# Patient Record
Sex: FEMALE | Race: WHITE | NOT HISPANIC OR LATINO | Employment: FULL TIME | URBAN - NONMETROPOLITAN AREA
[De-identification: names, ages, dates, MRNs, and addresses within clinical notes are randomized per-mention and may not be internally consistent; named-entity substitution may affect disease eponyms.]

---

## 2023-08-28 ENCOUNTER — OFFICE VISIT (OUTPATIENT)
Dept: CARDIOLOGY | Facility: CLINIC | Age: 43
End: 2023-08-28
Payer: COMMERCIAL

## 2023-08-28 VITALS
HEIGHT: 66 IN | HEART RATE: 96 BPM | WEIGHT: 218 LBS | OXYGEN SATURATION: 96 % | BODY MASS INDEX: 35.03 KG/M2 | SYSTOLIC BLOOD PRESSURE: 122 MMHG | DIASTOLIC BLOOD PRESSURE: 70 MMHG

## 2023-08-28 DIAGNOSIS — E78.2 MIXED HYPERLIPIDEMIA: ICD-10-CM

## 2023-08-28 DIAGNOSIS — G47.10 HYPERSOMNIA, UNSPECIFIED: Primary | ICD-10-CM

## 2023-08-28 PROCEDURE — 99204 OFFICE O/P NEW MOD 45 MIN: CPT | Performed by: NURSE PRACTITIONER

## 2023-08-28 RX ORDER — CHLORAL HYDRATE 500 MG
CAPSULE ORAL
COMMUNITY

## 2023-08-28 RX ORDER — ESCITALOPRAM OXALATE 10 MG/1
10 TABLET ORAL DAILY
COMMUNITY

## 2023-08-28 NOTE — ASSESSMENT & PLAN NOTE
Complaint for trouble staying awake during the day.she has a new bed partner who has reported snoring and witnessed apnea during sleep. She has a dry mouth in the mornings. She noticed a difference about 4-6 mouths ago with increased daytime sleepiness and increased fatigue during the day.    Plan Home sleep study for further evaluation for possible sleep apnea.

## 2023-08-28 NOTE — PATIENT INSTRUCTIONS
Quality Sleep Information, Adult  Quality sleep is important for your mental and physical health. It also improves your quality of life. Quality sleep means you:  Are asleep for most of the time you are in bed.  Fall asleep within 30 minutes.  Wake up no more than once a night.   Are awake for no longer than 20 minutes if you do wake up during the night.  Most adults need 7-8 hours of quality sleep each night.  How can poor sleep affect me?  If you do not get enough quality sleep, you may have:  Mood swings.  Daytime sleepiness.  Confusion.  Decreased reaction time.  Sleep disorders, such as insomnia and sleep apnea.  Difficulty with:  Solving problems.  Coping with stress.  Paying attention.  These issues may affect your performance and productivity at work, school, and at home. Lack of sleep may also put you at higher risk for accidents, suicide, and risky behaviors.  If you do not get quality sleep you may also be at higher risk for several health problems, including:  Infections.  Type 2 diabetes.  Heart disease.  High blood pressure.  Obesity.  Worsening of long-term conditions, like arthritis, kidney disease, depression, Parkinson's disease, and epilepsy.  What actions can I take to get more quality sleep?  A sign showing that a person should not smoke.         A sign showing that a person should not drink alcohol.      Stick to a sleep schedule. Go to sleep and wake up at about the same time each day. Do not try to sleep less on weekdays and make up for lost sleep on weekends. This does not work.  Try to get about 30 minutes of exercise on most days. Do not exercise 2-3 hours before going to bed.  Limit naps during the day to 30 minutes or less.  Do not use any products that contain nicotine or tobacco, such as cigarettes or e-cigarettes. If you need help quitting, ask your health care provider.  Do not drink caffeinated beverages for at least 8 hours before going to bed. Coffee, tea, and some sodas contain  caffeine.  Do not drink alcohol close to bedtime.  Do not eat large meals close to bedtime.  Do not take naps in the late afternoon.  Try to get at least 30 minutes of sunlight every day. Morning sunlight is best.  Make time to relax before bed. Reading, listening to music, or taking a hot bath promotes quality sleep.  Make your bedroom a place that promotes quality sleep. Keep your bedroom dark, quiet, and at a comfortable room temperature. Make sure your bed is comfortable. Take out sleep distractions like TV, a computer, smartphone, and bright lights.  If you are lying awake in bed for longer than 20 minutes, get up and do a relaxing activity until you feel sleepy.  Work with your health care provider to treat medical conditions that may affect sleeping, such as:  Nasal obstruction.  Snoring.  Sleep apnea and other sleep disorders.  Talk to your health care provider if you think any of your prescription medicines may cause you to have difficulty falling or staying asleep.  If you have sleep problems, talk with a sleep consultant. If you think you have a sleep disorder, talk with your health care provider about getting evaluated by a specialist.  Where to find more information  National Sleep Foundation website: https://sleepfoundation.org  National Heart, Lung, and Blood [place] (NHLBI): www.nhlbi.nih.gov/files/docs/public/sleep/healthy_sleep.pdf  Centers for Disease Control and Prevention (CDC): www.cdc.gov/sleep/index.html  Contact a health care provider if you:  Have trouble getting to sleep or staying asleep.  Often wake up very early in the morning and cannot get back to sleep.  Have daytime sleepiness.  Have daytime sleep attacks of suddenly falling asleep and sudden muscle weakness (narcolepsy).  Have a tingling sensation in your legs with a strong urge to move your legs (restless legs syndrome).  Stop breathing briefly during sleep (sleep apnea).  Think you have a sleep disorder or are taking a medicine  that is affecting your quality of sleep.  Summary  Most adults need 7-8 hours of quality sleep each night.  Getting enough quality sleep is an important part of health and well-being.  Make your bedroom a place that promotes quality sleep and avoid things that may cause you to have poor sleep, such as alcohol, caffeine, smoking, and large meals.  Talk to your health care provider if you have trouble falling asleep or staying asleep.  This information is not intended to replace advice given to you by your health care provider. Make sure you discuss any questions you have with your health care provider.  Document Revised: 10/17/2022 Document Reviewed: 10/17/2022  Elsevier Patient Education c 2022 [employer]

## 2023-08-28 NOTE — PROGRESS NOTES
New Sleep Consult     Date:   2023  Name: Ananya Che  :   1980  PCP: Alexandra Foster DO    Chief Complaint   Patient presents with    [facility] Care     Sleep evaluation   Neck size - 17       Subjective     History of Present Illness  Ananya hCe is a 43 y.o. female who presents today for self-referral for complaint for trouble staying awake during the day.she has a new bed partner who has reported snoring and witnessed apnea during sleep. She has a dry mouth in the mornings. She noticed a difference about 4-6 mouths ago with increased daytime sleepiness and increased fatigue during the day.    She reports she works full-time, has an adopted son that 7 years old and over the last year had a foster 2-year-old daughter in her home.  She reports she is active all the time.    Goes to bed 10PM  - [place] on the week ends. Up for work about 6-6:30AM and she sleeps past her alarms. She gets about 8 hours of sleep.     She works day shift Monday- Friday 8AM -4PM.     No specialty comments available.    Further details are as follows:    Neck Measurement: 17 inches    Tingley Scale is (out of 24): Total score: 10     Estimated average amount of sleep per night: 8  Number of times she wakes up at night: 0  Difficulty falling back asleep: no  It usually takes 5 minutes to go to sleep.  She feels sleepy upon waking up: yes  Rotating or night shift work: no    Drowsiness/Sleepiness:  She exhibits the following:  excessive daytime sleepiness  excessive daytime fatigue  falls asleep watching TV  falls asleep during times of the day when she is quiet    Snoring/Breathing:  She exhibits the following:  loud snoring, snores in all sleep positions, and awakens with dry mouth    Head Injury:  She exhibits the following:  No    Reflux:  She describes the following:  eats 2 meals daily     Narcolepsy:  She exhibits the following:  none    RLS/PLMs:  She describes the following:  none    Insomnia:  She  "describes the following:  none    Parasomnia:  She exhibits the following:  grinds teeth    Weight:       08/28/23  1307   Weight: 98.9 kg (218 lb)      Weight change in the last year:  loss: 10 lbs    The patient's relevant past medical, surgical, family, and social history reviewed and updated in Epic as appropriate.    Past Medical History:   Diagnosis Date    Anxiety      History reviewed. No pertinent surgical history.  OB History    No obstetric history on file.       No Known Allergies  Prior to Admission medications    Medication Sig Start Date End Date Taking? Authorizing Provider   escitalopram (LEXAPRO) 10 MG tablet Take 1 tablet by mouth Daily.   Yes Provider, MD Bella   Omega-3 Fatty Acids (fish oil) 1000 MG capsule capsule Take  by mouth Daily With Breakfast.   Yes ProviderBella MD     History reviewed. No pertinent family history.    Objective     Vital Signs:  /70 (BP Location: Left arm, Patient Position: Sitting)   Pulse 96   Ht 167.6 cm (66\")   Wt 98.9 kg (218 lb)   SpO2 96%   BMI 35.19 kg/mý     Class 2 Severe Obesity (BMI >=35 and <=39.9). Obesity-related health conditions include the following: dyslipidemias. Obesity is improving with lifestyle modifications. BMI is is above average; BMI management plan is completed. We discussed portion control and increasing exercise.        Physical Exam  Constitutional:       Appearance: Normal appearance. She is well-developed.   HENT:      Head: Normocephalic and atraumatic.      Nose: Nose normal.      Mouth/Throat:      Mouth: Mucous membranes are moist.   Eyes:      General: No scleral icterus.     Pupils: Pupils are equal, round, and reactive to light.   Neck:      Vascular: No carotid bruit.   Cardiovascular:      Rate and Rhythm: Normal rate and regular rhythm.      Pulses: Normal pulses.           Radial pulses are 2+ on the right side and 2+ on the left side.        Dorsalis pedis pulses are 2+ on the right side and 2+ on " the left side.        Posterior tibial pulses are 2+ on the right side and 2+ on the left side.      Heart sounds: Normal heart sounds. No murmur heard.  Pulmonary:      Effort: Pulmonary effort is normal.      Breath sounds: Normal breath sounds. No wheezing or rhonchi.   Abdominal:      General: Bowel sounds are normal.   Musculoskeletal:      Right lower leg: No edema.      Left lower leg: No edema.   Skin:     General: Skin is warm and dry.      Capillary Refill: Capillary refill takes less than 2 seconds.      Coloration: Skin is not cyanotic.      Nails: There is no clubbing.   Neurological:      Mental Status: She is alert and oriented to person, place, and time.      Motor: No weakness.      Gait: Gait normal.   Psychiatric:         Mood and Affect: Mood normal.         Behavior: Behavior normal. Behavior is cooperative.         Thought Content: Thought content normal.         Cognition and Memory: Memory normal.           Labs reviewed: 11/1/2022 lipids, CBC, CMP, hemoglobin A1c and TSH free T4 and office visit with PCP Dr. Alexandra Foster 10/10/2022          Assessment and Plan     Ananya Che is a 43 y.o. female who presents for further evaluation of excessive daytime sleepiness and fatigue and concerns for sleep disordered breathing and obstructive sleep apnea.  We will obtain testing for further evaluation.  The patient will return for follow-up and recommendations after test.  I have discussed weight loss as it pertains to obstructive sleep apnea.    Diagnoses and all orders for this visit:    1. Hypersomnia, unspecified (Primary)  Assessment & Plan:  Complaint for trouble staying awake during the day.she has a new bed partner who has reported snoring and witnessed apnea during sleep. She has a dry mouth in the mornings. She noticed a difference about 4-6 mouths ago with increased daytime sleepiness and increased fatigue during the day.    Plan Home sleep study for further evaluation for  possible sleep apnea.    Orders:  -     Home Sleep Study; Future    2. Mixed hyperlipidemia  Assessment & Plan:  Her last fasting labs was in November 2022.    These appear to be mixed hyperlipidemia.    Her HDL is low and she has been on a supplemental omega-3 fish oil.    Her total cholesterol is near 200.    Her triglycerides are elevated and discussed this is more about sugar and carbohydrate intake.    Her LDL is about 125.  We discussed that she needs to consider dietary changes of eliminating animal meat and animal meat sources such as eggs cheese and milk from her diet.    We discussed these are risk factors for CAD.    She is planning repeat fasting lipids in about 2 months with her family physician for further evaluation and treatment.          I discussed the consequences of uncontrolled sleep apnea including hypertension, heart disease, diabetes, stroke, and dementia. I further discussed sleep apnea therapeutic options including CPAP, Weight loss, Oral dental appliance, and surgery.             Follow Up  Return in about 3 months (around 11/28/2023) for Follow-Up after studies.  Patient was given instructions and counseling regarding her condition or for health maintenance advice. Please see specific information pulled into the AVS if appropriate.

## 2023-08-28 NOTE — ASSESSMENT & PLAN NOTE
Her last fasting labs was in November 2022.    These appear to be mixed hyperlipidemia.    Her HDL is low and she has been on a supplemental omega-3 fish oil.    Her total cholesterol is near 200.    Her triglycerides are elevated and discussed this is more about sugar and carbohydrate intake.    Her LDL is about 125.  We discussed that she needs to consider dietary changes of eliminating animal meat and animal meat sources such as eggs cheese and milk from her diet.    We discussed these are risk factors for CAD.    She is planning repeat fasting lipids in about 2 months with her family physician for further evaluation and treatment.

## 2023-09-11 DIAGNOSIS — G47.10 HYPERSOMNIA, UNSPECIFIED: ICD-10-CM

## 2023-09-12 ENCOUNTER — TELEPHONE (OUTPATIENT)
Dept: CARDIOLOGY | Facility: CLINIC | Age: 43
End: 2023-09-12
Payer: COMMERCIAL

## 2023-09-12 DIAGNOSIS — G47.33 OSA (OBSTRUCTIVE SLEEP APNEA): Primary | ICD-10-CM

## 2023-09-12 NOTE — TELEPHONE ENCOUNTER
Informed patient of HST. Patient would like orders to be sent to [facility] in [place].

## 2023-09-12 NOTE — TELEPHONE ENCOUNTER
----- Message from Jackelin Herrera MA sent at 9/11/2023  4:10 PM EDT -----  Called left VM to give us a call back about her sleep results.

## 2023-09-18 ENCOUNTER — TELEPHONE (OUTPATIENT)
Dept: CARDIOLOGY | Facility: CLINIC | Age: 43
End: 2023-09-18

## 2023-09-18 DIAGNOSIS — G47.33 OSA (OBSTRUCTIVE SLEEP APNEA): Primary | ICD-10-CM

## 2023-09-18 NOTE — TELEPHONE ENCOUNTER
Caller: Ananya Che    Relationship: Self    Best call back number: [phone] (WORK PHONE, DOES NOT HAVE CELL PHONE FROM 8-4:30)     What is the best time to reach you: ANYTIME     Do you know the name of the person who called: ELIZABETH     What was the call regarding: PT REPORTS THAT THEY WERE GIVEN A CALL TODAY  BUT THERE IS NO DOCUMENTATION IN THE CHART ABOUT THIS. IF THIS WAS NOT A MISTAKE, PLEASE CALL PT BACK.     Is it okay if the provider responds through MyChart: CALL

## 2024-07-23 ENCOUNTER — TELEPHONE (OUTPATIENT)
Dept: CARDIOLOGY | Facility: CLINIC | Age: 44
End: 2024-07-23
Payer: COMMERCIAL

## 2024-07-23 NOTE — TELEPHONE ENCOUNTER
Caller: Ananya Che    Relationship to patient: Self    Best call back number: [phone]    Chief complaint:     Type of visit: FOLLOW UP    Requested date:      If rescheduling, when is the original appointment: NONE     Additional notes:[facility] HAS NO SCHEDULING TIME FRAME PATIENT WAS LAST SEEN 8.2023. PLEASE CALL THE PATIENT TO SCHEDULE

## 2024-07-30 ENCOUNTER — OFFICE VISIT (OUTPATIENT)
Dept: CARDIOLOGY | Facility: CLINIC | Age: 44
End: 2024-07-30
Payer: COMMERCIAL

## 2024-07-30 VITALS
WEIGHT: 228 LBS | HEART RATE: 81 BPM | SYSTOLIC BLOOD PRESSURE: 136 MMHG | DIASTOLIC BLOOD PRESSURE: 88 MMHG | OXYGEN SATURATION: 96 % | HEIGHT: 66 IN | BODY MASS INDEX: 36.64 KG/M2

## 2024-07-30 DIAGNOSIS — G47.33 OSA (OBSTRUCTIVE SLEEP APNEA): Primary | ICD-10-CM

## 2024-07-30 PROBLEM — G47.10 HYPERSOMNIA, UNSPECIFIED: Status: RESOLVED | Noted: 2023-08-28 | Resolved: 2024-07-30

## 2024-07-30 PROCEDURE — 99213 OFFICE O/P EST LOW 20 MIN: CPT | Performed by: NURSE PRACTITIONER

## 2024-07-30 RX ORDER — ESCITALOPRAM OXALATE 20 MG/1
1 TABLET ORAL DAILY
COMMUNITY

## 2024-07-30 RX ORDER — OMEPRAZOLE 20 MG/1
1 CAPSULE, DELAYED RELEASE ORAL DAILY
COMMUNITY
Start: 2024-07-02

## 2024-07-30 NOTE — PROGRESS NOTES
"    Follow-Up Sleep Consult     Date:   2024  Name: Ananya Che  :   1980  PCP: Alexandra Foster DO    Chief Complaint   Patient presents with    Sleep Apnea       Subjective     History of Present Illness  Ananya Che is a 43 y.o. female who presents today for follow-up on JAMIE.  She comes in today for her 1 year follow-up visit on her known history of moderate to severe sleep apnea on CPAP therapy.    Sleep history:    JAMIE baseline AHI 25/AHI 50 REM HST 2023    Current JAMIE therapy 6 to 16 cm      Current mask used is fullface mask    Device Functioning Well: Yes  Mask Fit Comfortable: Yes  Air Flow Comfortable: Yes but airflow does seem strong at times.  DME Helpful for Supplies: Yes  Sleep is rested: Yes        Device Download:                         The patient's relevant past medical, surgical, family, and social history reviewed and updated in Epic as appropriate.    Past Medical History:   Diagnosis Date    Anxiety     Sleep apnea      History reviewed. No pertinent surgical history.  OB History    No obstetric history on file.       No Known Allergies  Prior to Admission medications    Medication Sig Start Date End Date Taking? Authorizing Provider   escitalopram (LEXAPRO) 20 MG tablet Take 1 tablet by mouth Daily.   Yes Bella Dill MD   Omega-3 Fatty Acids (fish oil) 1000 MG capsule capsule Take  by mouth Daily With Breakfast.   Yes Bella Dill MD   omeprazole (priLOSEC) 20 MG capsule Take 1 capsule by mouth Daily. 24  Yes Bella Dill MD   escitalopram (LEXAPRO) 10 MG tablet Take 1 tablet by mouth Daily.  24  Bella Dill MD     History reviewed. No pertinent family history.    Objective     Vital Signs:  /88   Pulse 81   Ht 167.6 cm (66\")   Wt 103 kg (228 lb)   SpO2 96%   BMI 36.80 kg/m²              Physical Exam  HENT:      Head: Normocephalic.      Nose: Nose normal.      Mouth/Throat:      Mouth: Mucous " membranes are moist.   Pulmonary:      Effort: Pulmonary effort is normal.   Skin:     General: Skin is warm and dry.   Neurological:      Mental Status: She is alert and oriented to person, place, and time.   Psychiatric:         Mood and Affect: Mood normal.         Behavior: Behavior normal.         Thought Content: Thought content normal.         The following data was reviewed by: CHARLOTTE Oropeza on 07/30/2024:    PAP download reviewed: 30-day download as above.  I have reviewed and interpreted the data on the download at today's visit.         Assessment and Plan     Diagnoses and all orders for this visit:    1. JAMIE (obstructive sleep apnea) (Primary)  Assessment & Plan:  Baseline AHI is 25.  This is moderate sleep apnea.  AHI increases to 50 in rem sleep.  This is severe sleep apnea and REM sleep.    She is on CPAP therapy.  Download reviewed with good control and good compliance.    She is benefiting from PAP therapy.    We plan to continue PAP therapy.    Prescription for CPAP supplies to the DME of patient's choice.    Plan follow-up in 1 year or sooner for any JAMIE or PAP concerns.    Orders:  -     PAP Therapy        Report if any new/changing symptoms immediately         Follow Up  Return in about 1 year (around 7/30/2025) for JAMIE.  Patient was given instructions and counseling regarding her condition or for health maintenance advice. Please see specific information pulled into the AVS if appropriate.

## 2024-07-30 NOTE — ASSESSMENT & PLAN NOTE
Baseline AHI is 25.  This is moderate sleep apnea.  AHI increases to 50 in rem sleep.  This is severe sleep apnea and REM sleep.    She is on CPAP therapy.  Download reviewed with good control and good compliance.    She is benefiting from PAP therapy.    We plan to continue PAP therapy.    Prescription for CPAP supplies to the DME of patient's choice.    Plan follow-up in 1 year or sooner for any JAMIE or PAP concerns.

## 2024-08-16 ENCOUNTER — TELEPHONE (OUTPATIENT)
Dept: CARDIOLOGY | Facility: CLINIC | Age: 44
End: 2024-08-16

## 2025-08-06 ENCOUNTER — OFFICE VISIT (OUTPATIENT)
Dept: CARDIOLOGY | Facility: CLINIC | Age: 45
End: 2025-08-06
Payer: COMMERCIAL

## 2025-08-06 VITALS
DIASTOLIC BLOOD PRESSURE: 88 MMHG | HEART RATE: 94 BPM | OXYGEN SATURATION: 96 % | BODY MASS INDEX: 33.75 KG/M2 | HEIGHT: 66 IN | SYSTOLIC BLOOD PRESSURE: 138 MMHG | WEIGHT: 210 LBS

## 2025-08-06 DIAGNOSIS — G47.33 OSA (OBSTRUCTIVE SLEEP APNEA): Primary | ICD-10-CM

## 2025-08-06 PROCEDURE — 99213 OFFICE O/P EST LOW 20 MIN: CPT | Performed by: NURSE PRACTITIONER

## 2025-08-06 RX ORDER — SEMAGLUTIDE 1 MG/.5ML
INJECTION, SOLUTION SUBCUTANEOUS
COMMUNITY